# Patient Record
Sex: FEMALE | Race: WHITE
[De-identification: names, ages, dates, MRNs, and addresses within clinical notes are randomized per-mention and may not be internally consistent; named-entity substitution may affect disease eponyms.]

---

## 2019-02-07 ENCOUNTER — HOSPITAL ENCOUNTER (EMERGENCY)
Dept: HOSPITAL 54 - ER | Age: 24
Discharge: HOME | End: 2019-02-07
Payer: COMMERCIAL

## 2019-02-07 VITALS
DIASTOLIC BLOOD PRESSURE: 75 MMHG | BODY MASS INDEX: 26.9 KG/M2 | HEIGHT: 60 IN | WEIGHT: 137 LBS | SYSTOLIC BLOOD PRESSURE: 115 MMHG
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
## 2019-02-07 DIAGNOSIS — S60.221A: Primary | ICD-10-CM
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
## 2019-02-07 PROCEDURE — 73130 X-RAY EXAM OF HAND: CPT

## 2019-02-07 PROCEDURE — 99283 EMERGENCY DEPT VISIT LOW MDM: CPT

## 2019-04-07 ENCOUNTER — HOSPITAL ENCOUNTER (EMERGENCY)
Dept: HOSPITAL 54 - ER | Age: 24
Discharge: HOME | End: 2019-04-07
Payer: COMMERCIAL

## 2019-04-07 VITALS — WEIGHT: 140 LBS | BODY MASS INDEX: 27.48 KG/M2 | HEIGHT: 60 IN

## 2019-04-07 VITALS — DIASTOLIC BLOOD PRESSURE: 70 MMHG | SYSTOLIC BLOOD PRESSURE: 125 MMHG

## 2019-04-07 DIAGNOSIS — Z3A.22: ICD-10-CM

## 2019-04-07 DIAGNOSIS — O46.92: Primary | ICD-10-CM

## 2019-04-07 LAB
BASOPHILS # BLD AUTO: 0.1 /CMM (ref 0–0.2)
BASOPHILS NFR BLD AUTO: 0.6 % (ref 0–2)
EOSINOPHIL NFR BLD AUTO: 0.7 % (ref 0–6)
HCT VFR BLD AUTO: 35 % (ref 33–45)
HGB BLD-MCNC: 11.9 G/DL (ref 11.5–14.8)
LYMPHOCYTES NFR BLD AUTO: 1.5 /CMM (ref 0.8–4.8)
LYMPHOCYTES NFR BLD AUTO: 13.3 % (ref 20–44)
MCHC RBC AUTO-ENTMCNC: 34 G/DL (ref 31–36)
MCV RBC AUTO: 91 FL (ref 82–100)
MONOCYTES NFR BLD AUTO: 0.5 /CMM (ref 0.1–1.3)
MONOCYTES NFR BLD AUTO: 4.5 % (ref 2–12)
NEUTROPHILS # BLD AUTO: 9.1 /CMM (ref 1.8–8.9)
NEUTROPHILS NFR BLD AUTO: 80.9 % (ref 43–81)
PLATELET # BLD AUTO: 299 /CMM (ref 150–450)
RBC # BLD AUTO: 3.83 MIL/UL (ref 4–5.2)
WBC NRBC COR # BLD AUTO: 11.3 K/UL (ref 4.3–11)

## 2019-12-25 ENCOUNTER — HOSPITAL ENCOUNTER (EMERGENCY)
Dept: HOSPITAL 54 - ER | Age: 24
Discharge: HOME | End: 2019-12-25
Payer: COMMERCIAL

## 2019-12-25 VITALS — WEIGHT: 130 LBS | BODY MASS INDEX: 25.52 KG/M2 | HEIGHT: 60 IN

## 2019-12-25 VITALS — SYSTOLIC BLOOD PRESSURE: 118 MMHG | DIASTOLIC BLOOD PRESSURE: 82 MMHG

## 2019-12-25 DIAGNOSIS — J06.9: Primary | ICD-10-CM

## 2020-09-03 ENCOUNTER — HOSPITAL ENCOUNTER (EMERGENCY)
Dept: HOSPITAL 54 - ER | Age: 25
Discharge: HOME | End: 2020-09-03
Payer: COMMERCIAL

## 2020-09-03 VITALS — HEIGHT: 60 IN | WEIGHT: 130 LBS | BODY MASS INDEX: 25.52 KG/M2

## 2020-09-03 VITALS — DIASTOLIC BLOOD PRESSURE: 71 MMHG | SYSTOLIC BLOOD PRESSURE: 129 MMHG

## 2020-09-03 DIAGNOSIS — N61.0: Primary | ICD-10-CM

## 2020-09-03 NOTE — NUR
PT CAME TO THE ED C/O R BREAST PAIN AND "LUMP". PT NOTICED THE LUMP YESTERDAY 
WHILE BREAST FEEDING PER PT. PT AAOX4, VSSS, RESPIRATIONS EVEN AND UNLABORED ON 
RA W/ NAD NOTED. PT CONNECTED TO THE MONITOR AND POX.

## 2021-09-09 ENCOUNTER — HOSPITAL ENCOUNTER (EMERGENCY)
Dept: HOSPITAL 12 - ER | Age: 26
Discharge: HOME | End: 2021-09-09
Payer: MEDICAID

## 2021-09-09 VITALS — DIASTOLIC BLOOD PRESSURE: 66 MMHG | SYSTOLIC BLOOD PRESSURE: 106 MMHG

## 2021-09-09 VITALS — HEIGHT: 60 IN | WEIGHT: 134 LBS | BODY MASS INDEX: 26.31 KG/M2

## 2021-09-09 DIAGNOSIS — Z20.822: ICD-10-CM

## 2021-09-09 DIAGNOSIS — B34.9: Primary | ICD-10-CM

## 2021-09-09 PROCEDURE — A4663 DIALYSIS BLOOD PRESSURE CUFF: HCPCS

## 2021-09-09 RX ADMIN — IBUPROFEN ONE MG: 600 TABLET, FILM COATED ORAL at 09:45

## 2021-09-10 ENCOUNTER — HOSPITAL ENCOUNTER (EMERGENCY)
Dept: HOSPITAL 54 - ER | Age: 26
Discharge: HOME | End: 2021-09-10
Payer: COMMERCIAL

## 2021-09-10 VITALS — HEIGHT: 60 IN | WEIGHT: 135 LBS | BODY MASS INDEX: 26.5 KG/M2

## 2021-09-10 VITALS — SYSTOLIC BLOOD PRESSURE: 115 MMHG | DIASTOLIC BLOOD PRESSURE: 62 MMHG

## 2021-09-10 DIAGNOSIS — B34.9: Primary | ICD-10-CM

## 2021-09-10 DIAGNOSIS — Z20.822: ICD-10-CM

## 2021-09-10 PROCEDURE — C9803 HOPD COVID-19 SPEC COLLECT: HCPCS

## 2021-09-10 PROCEDURE — 87426 SARSCOV CORONAVIRUS AG IA: CPT

## 2021-09-10 PROCEDURE — 99283 EMERGENCY DEPT VISIT LOW MDM: CPT

## 2021-09-10 NOTE — NUR
"Fever/Body aches/HA/ sore throat/congestion/Weak had covid test done yesterday 
was told Negative" 

PT AAOX4, VSS. RR EVEN & UNLABORED. DENIES CP, SOB, DIZZINESS, N/V AT THIS 
TIME. WILL CONT TO MONITOR.

## 2021-10-03 ENCOUNTER — HOSPITAL ENCOUNTER (EMERGENCY)
Dept: HOSPITAL 54 - ER | Age: 26
Discharge: HOME | End: 2021-10-03
Payer: COMMERCIAL

## 2021-10-03 VITALS — WEIGHT: 134 LBS | BODY MASS INDEX: 26.31 KG/M2 | HEIGHT: 60 IN

## 2021-10-03 VITALS — DIASTOLIC BLOOD PRESSURE: 82 MMHG | SYSTOLIC BLOOD PRESSURE: 126 MMHG

## 2021-10-03 DIAGNOSIS — K76.0: ICD-10-CM

## 2021-10-03 DIAGNOSIS — N39.0: Primary | ICD-10-CM

## 2021-10-03 LAB
ALBUMIN SERPL BCP-MCNC: 3.8 G/DL (ref 3.4–5)
ALP SERPL-CCNC: 77 U/L (ref 46–116)
ALT SERPL W P-5'-P-CCNC: 195 U/L (ref 12–78)
AST SERPL W P-5'-P-CCNC: 79 U/L (ref 15–37)
BASOPHILS # BLD AUTO: 0.1 K/UL (ref 0–0.2)
BASOPHILS NFR BLD AUTO: 0.4 % (ref 0–2)
BILIRUB DIRECT SERPL-MCNC: 0.1 MG/DL (ref 0–0.2)
BILIRUB SERPL-MCNC: 0.5 MG/DL (ref 0.2–1)
BILIRUB UR QL STRIP: (no result)
BUN SERPL-MCNC: 11 MG/DL (ref 7–18)
CALCIUM SERPL-MCNC: 8.4 MG/DL (ref 8.5–10.1)
CHLORIDE SERPL-SCNC: 99 MMOL/L (ref 98–107)
CO2 SERPL-SCNC: 29 MMOL/L (ref 21–32)
COLOR UR: (no result)
CREAT SERPL-MCNC: 0.7 MG/DL (ref 0.6–1.3)
EOSINOPHIL NFR BLD AUTO: 0.5 % (ref 0–6)
GLUCOSE SERPL-MCNC: 137 MG/DL (ref 74–106)
GLUCOSE UR STRIP-MCNC: NEGATIVE MG/DL
HCT VFR BLD AUTO: 43 % (ref 33–45)
HGB BLD-MCNC: 14.5 G/DL (ref 11.5–14.8)
LEUKOCYTE ESTERASE UR QL STRIP: (no result)
LIPASE SERPL-CCNC: 80 U/L (ref 73–393)
LYMPHOCYTES NFR BLD AUTO: 1.9 K/UL (ref 0.8–4.8)
LYMPHOCYTES NFR BLD AUTO: 16.4 % (ref 20–44)
MCHC RBC AUTO-ENTMCNC: 34 G/DL (ref 31–36)
MCV RBC AUTO: 90 FL (ref 82–100)
MONOCYTES NFR BLD AUTO: 0.5 K/UL (ref 0.1–1.3)
MONOCYTES NFR BLD AUTO: 3.9 % (ref 2–12)
NEUTROPHILS # BLD AUTO: 9.3 K/UL (ref 1.8–8.9)
NEUTROPHILS NFR BLD AUTO: 78.8 % (ref 43–81)
NITRITE UR QL STRIP: POSITIVE
PH UR STRIP: 6.5 [PH] (ref 5–8)
PLATELET # BLD AUTO: 402 K/UL (ref 150–450)
POTASSIUM SERPL-SCNC: 4 MMOL/L (ref 3.5–5.1)
PROT SERPL-MCNC: 8.1 G/DL (ref 6.4–8.2)
PROT UR QL STRIP: >=300 MG/DL
RBC # BLD AUTO: 4.8 MIL/UL (ref 4–5.2)
RBC #/AREA URNS HPF: (no result) /HPF (ref 0–2)
SODIUM SERPL-SCNC: 137 MMOL/L (ref 136–145)
UROBILINOGEN UR STRIP-MCNC: 1 EU/DL
WBC #/AREA URNS HPF: (no result) /HPF (ref 0–3)
WBC NRBC COR # BLD AUTO: 11.8 K/UL (ref 4.3–11)

## 2021-10-03 PROCEDURE — 96375 TX/PRO/DX INJ NEW DRUG ADDON: CPT

## 2021-10-03 PROCEDURE — 36415 COLL VENOUS BLD VENIPUNCTURE: CPT

## 2021-10-03 PROCEDURE — 99284 EMERGENCY DEPT VISIT MOD MDM: CPT

## 2021-10-03 PROCEDURE — 83690 ASSAY OF LIPASE: CPT

## 2021-10-03 PROCEDURE — 81001 URINALYSIS AUTO W/SCOPE: CPT

## 2021-10-03 PROCEDURE — 76700 US EXAM ABDOM COMPLETE: CPT

## 2021-10-03 PROCEDURE — 80048 BASIC METABOLIC PNL TOTAL CA: CPT

## 2021-10-03 PROCEDURE — 96374 THER/PROPH/DIAG INJ IV PUSH: CPT

## 2021-10-03 PROCEDURE — 85025 COMPLETE CBC W/AUTO DIFF WBC: CPT

## 2021-10-03 PROCEDURE — 84703 CHORIONIC GONADOTROPIN ASSAY: CPT

## 2021-10-03 PROCEDURE — 87086 URINE CULTURE/COLONY COUNT: CPT

## 2021-10-03 PROCEDURE — 80076 HEPATIC FUNCTION PANEL: CPT

## 2021-10-03 NOTE — NUR
DIFFUSE ABD PAIN +NAUSEA -DIARRHEA. PT AAOX4, VSS. RR EVEN & UNLABORED. DENIES 
CP, SOB, DIZZINESS AT THIS TIME. AWAITING EVAL BY ERMD. WILL CONT TO MONITOR.

## 2022-06-05 ENCOUNTER — HOSPITAL ENCOUNTER (EMERGENCY)
Dept: HOSPITAL 12 - ER | Age: 27
Discharge: HOME | End: 2022-06-05
Payer: COMMERCIAL

## 2022-06-05 VITALS — WEIGHT: 145 LBS | HEIGHT: 65 IN | BODY MASS INDEX: 24.16 KG/M2
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
## 2022-06-05 DIAGNOSIS — S46.911A: Primary | ICD-10-CM
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
## 2022-06-05 PROCEDURE — A4663 DIALYSIS BLOOD PRESSURE CUFF: HCPCS

## 2022-06-05 PROCEDURE — 96372 THER/PROPH/DIAG INJ SC/IM: CPT

## 2022-06-05 PROCEDURE — 99283 EMERGENCY DEPT VISIT LOW MDM: CPT

## 2022-07-08 ENCOUNTER — HOSPITAL ENCOUNTER (EMERGENCY)
Dept: HOSPITAL 12 - ER | Age: 27
Discharge: HOME | End: 2022-07-08
Payer: COMMERCIAL

## 2022-07-08 VITALS — BODY MASS INDEX: 26.31 KG/M2 | HEIGHT: 60 IN | WEIGHT: 134 LBS

## 2022-07-08 VITALS — DIASTOLIC BLOOD PRESSURE: 70 MMHG | SYSTOLIC BLOOD PRESSURE: 135 MMHG

## 2022-07-08 DIAGNOSIS — G43.909: Primary | ICD-10-CM

## 2022-07-08 LAB
BUN SERPL-MCNC: 17 MG/DL (ref 7–18)
CHLORIDE SERPL-SCNC: 101 MMOL/L (ref 98–107)
CO2 SERPL-SCNC: 27 MMOL/L (ref 21–32)
CREAT SERPL-MCNC: 0.8 MG/DL (ref 0.6–1.3)
GLUCOSE SERPL-MCNC: 103 MG/DL (ref 74–106)
HCT VFR BLD AUTO: 39.9 % (ref 31.2–41.9)
MCH RBC QN AUTO: 30.1 UUG (ref 24.7–32.8)
MCV RBC AUTO: 85.7 FL (ref 75.5–95.3)
PLATELET # BLD AUTO: 380 K/UL (ref 179–408)
POTASSIUM SERPL-SCNC: 4.3 MMOL/L (ref 3.5–5.1)

## 2022-07-08 PROCEDURE — 36415 COLL VENOUS BLD VENIPUNCTURE: CPT

## 2022-07-08 PROCEDURE — 85025 COMPLETE CBC W/AUTO DIFF WBC: CPT

## 2022-07-08 PROCEDURE — 99284 EMERGENCY DEPT VISIT MOD MDM: CPT

## 2022-07-08 PROCEDURE — 80048 BASIC METABOLIC PNL TOTAL CA: CPT

## 2022-07-08 PROCEDURE — 96361 HYDRATE IV INFUSION ADD-ON: CPT

## 2022-07-08 PROCEDURE — 83735 ASSAY OF MAGNESIUM: CPT

## 2022-07-08 PROCEDURE — 96374 THER/PROPH/DIAG INJ IV PUSH: CPT

## 2022-07-08 PROCEDURE — 96375 TX/PRO/DX INJ NEW DRUG ADDON: CPT

## 2022-07-08 PROCEDURE — A4663 DIALYSIS BLOOD PRESSURE CUFF: HCPCS

## 2024-04-27 ENCOUNTER — HOSPITAL ENCOUNTER (EMERGENCY)
Dept: HOSPITAL 12 - ER | Age: 29
Discharge: HOME | End: 2024-04-27
Payer: COMMERCIAL

## 2024-04-27 VITALS — DIASTOLIC BLOOD PRESSURE: 70 MMHG | SYSTOLIC BLOOD PRESSURE: 129 MMHG | OXYGEN SATURATION: 97 % | TEMPERATURE: 98.3 F

## 2024-04-27 VITALS — BODY MASS INDEX: 27.48 KG/M2 | HEIGHT: 60 IN | WEIGHT: 140 LBS

## 2024-04-27 DIAGNOSIS — B34.9: Primary | ICD-10-CM

## 2024-04-27 DIAGNOSIS — Z79.899: ICD-10-CM

## 2024-04-27 PROCEDURE — A4663 DIALYSIS BLOOD PRESSURE CUFF: HCPCS

## 2024-04-27 PROCEDURE — A4606 OXYGEN PROBE USED W OXIMETER: HCPCS

## 2024-04-27 PROCEDURE — 99283 EMERGENCY DEPT VISIT LOW MDM: CPT
